# Patient Record
Sex: FEMALE | Race: WHITE | URBAN - METROPOLITAN AREA
[De-identification: names, ages, dates, MRNs, and addresses within clinical notes are randomized per-mention and may not be internally consistent; named-entity substitution may affect disease eponyms.]

---

## 2020-02-21 ENCOUNTER — HISTORICAL (OUTPATIENT)
Dept: RADIOLOGY | Facility: HOSPITAL | Age: 40
End: 2020-02-21

## 2020-03-10 ENCOUNTER — HISTORICAL (OUTPATIENT)
Dept: ADMINISTRATIVE | Facility: HOSPITAL | Age: 40
End: 2020-03-10

## 2020-03-10 LAB
ABS NEUT (OLG): 4.58 X10(3)/MCL (ref 2.1–9.2)
BASOPHILS # BLD AUTO: 0.1 X10(3)/MCL (ref 0–0.2)
BASOPHILS NFR BLD AUTO: 1 %
EOSINOPHIL # BLD AUTO: 0.2 X10(3)/MCL (ref 0–0.9)
EOSINOPHIL NFR BLD AUTO: 3 %
ERYTHROCYTE [DISTWIDTH] IN BLOOD BY AUTOMATED COUNT: 12.9 % (ref 11.5–14.5)
HCT VFR BLD AUTO: 41.3 % (ref 35–46)
HGB BLD-MCNC: 13.3 GM/DL (ref 12–16)
IMM GRANULOCYTES # BLD AUTO: 0.02 10*3/UL
IMM GRANULOCYTES NFR BLD AUTO: 0 %
LYMPHOCYTES # BLD AUTO: 3 X10(3)/MCL (ref 0.6–4.6)
LYMPHOCYTES NFR BLD AUTO: 35 %
MCH RBC QN AUTO: 29 PG (ref 26–34)
MCHC RBC AUTO-ENTMCNC: 32.2 GM/DL (ref 31–37)
MCV RBC AUTO: 90 FL (ref 80–100)
MONOCYTES # BLD AUTO: 0.6 X10(3)/MCL (ref 0.1–1.3)
MONOCYTES NFR BLD AUTO: 7 %
NEUTROPHILS # BLD AUTO: 4.58 X10(3)/MCL (ref 2.1–9.2)
NEUTROPHILS NFR BLD AUTO: 54 %
PLATELET # BLD AUTO: 340 X10(3)/MCL (ref 130–400)
PMV BLD AUTO: 8.9 FL (ref 7.4–10.4)
RBC # BLD AUTO: 4.59 X10(6)/MCL (ref 4–5.2)
WBC # SPEC AUTO: 8.4 X10(3)/MCL (ref 4.5–11)

## 2021-03-10 ENCOUNTER — HISTORICAL (OUTPATIENT)
Dept: ADMINISTRATIVE | Facility: HOSPITAL | Age: 41
End: 2021-03-10

## 2021-03-10 LAB
ABS NEUT (OLG): 3.33 X10(3)/MCL (ref 2.1–9.2)
ALBUMIN SERPL-MCNC: 4 GM/DL (ref 3.5–5)
ALBUMIN/GLOB SERPL: 1.2 RATIO (ref 1.1–2)
ALP SERPL-CCNC: 71 UNIT/L (ref 40–150)
ALT SERPL-CCNC: 26 UNIT/L (ref 0–55)
AST SERPL-CCNC: 20 UNIT/L (ref 5–34)
BASOPHILS # BLD AUTO: 0.1 X10(3)/MCL (ref 0–0.2)
BASOPHILS NFR BLD AUTO: 1 %
BILIRUB SERPL-MCNC: 0.4 MG/DL
BILIRUBIN DIRECT+TOT PNL SERPL-MCNC: 0.2 MG/DL (ref 0–0.5)
BILIRUBIN DIRECT+TOT PNL SERPL-MCNC: 0.2 MG/DL (ref 0–0.8)
BUN SERPL-MCNC: 12.7 MG/DL (ref 7–18.7)
CALCIUM SERPL-MCNC: 9.1 MG/DL (ref 8.4–10.2)
CHLORIDE SERPL-SCNC: 105 MMOL/L (ref 98–107)
CHOLEST SERPL-MCNC: 203 MG/DL
CHOLEST/HDLC SERPL: 4 {RATIO} (ref 0–5)
CO2 SERPL-SCNC: 26 MMOL/L (ref 22–29)
CREAT SERPL-MCNC: 0.86 MG/DL (ref 0.55–1.02)
DEPRECATED CALCIDIOL+CALCIFEROL SERPL-MC: 38.4 NG/ML (ref 30–80)
EOSINOPHIL # BLD AUTO: 0.3 X10(3)/MCL (ref 0–0.9)
EOSINOPHIL NFR BLD AUTO: 4 %
ERYTHROCYTE [DISTWIDTH] IN BLOOD BY AUTOMATED COUNT: 12.7 % (ref 11.5–14.5)
EST. AVERAGE GLUCOSE BLD GHB EST-MCNC: 102.5 MG/DL
FOLATE SERPL-MCNC: 7.5 NG/ML (ref 7–31.4)
GLOBULIN SER-MCNC: 3.3 GM/DL (ref 2.4–3.5)
GLUCOSE SERPL-MCNC: 98 MG/DL (ref 74–100)
HBA1C MFR BLD: 5.2 %
HCT VFR BLD AUTO: 41.4 % (ref 35–46)
HDLC SERPL-MCNC: 58 MG/DL (ref 35–60)
HGB BLD-MCNC: 13.7 GM/DL (ref 12–16)
IMM GRANULOCYTES # BLD AUTO: 0.01 10*3/UL
IMM GRANULOCYTES NFR BLD AUTO: 0 %
LDLC SERPL CALC-MCNC: 116 MG/DL (ref 50–140)
LYMPHOCYTES # BLD AUTO: 3 X10(3)/MCL (ref 0.6–4.6)
LYMPHOCYTES NFR BLD AUTO: 40 %
MCH RBC QN AUTO: 29.5 PG (ref 26–34)
MCHC RBC AUTO-ENTMCNC: 33.1 GM/DL (ref 31–37)
MCV RBC AUTO: 89.2 FL (ref 80–100)
MONOCYTES # BLD AUTO: 0.6 X10(3)/MCL (ref 0.1–1.3)
MONOCYTES NFR BLD AUTO: 8 %
NEUTROPHILS # BLD AUTO: 3.33 X10(3)/MCL (ref 2.1–9.2)
NEUTROPHILS NFR BLD AUTO: 46 %
PLATELET # BLD AUTO: 323 X10(3)/MCL (ref 130–400)
PMV BLD AUTO: 9.2 FL (ref 7.4–10.4)
POTASSIUM SERPL-SCNC: 3.9 MMOL/L (ref 3.5–5.1)
PROT SERPL-MCNC: 7.3 GM/DL (ref 6.4–8.3)
RBC # BLD AUTO: 4.64 X10(6)/MCL (ref 4–5.2)
SODIUM SERPL-SCNC: 141 MMOL/L (ref 136–145)
TRIGL SERPL-MCNC: 147 MG/DL (ref 37–140)
TSH SERPL-ACNC: 3.11 UIU/ML (ref 0.35–4.94)
VLDLC SERPL CALC-MCNC: 29 MG/DL
WBC # SPEC AUTO: 7.3 X10(3)/MCL (ref 4.5–11)

## 2021-04-26 ENCOUNTER — HISTORICAL (OUTPATIENT)
Dept: RADIOLOGY | Facility: HOSPITAL | Age: 41
End: 2021-04-26

## 2021-05-06 LAB — POC BETA-HCG (QUAL): POSITIVE

## 2021-06-21 LAB — POC BETA-HCG (QUAL): POSITIVE

## 2021-06-24 ENCOUNTER — HISTORICAL (OUTPATIENT)
Dept: ADMINISTRATIVE | Facility: HOSPITAL | Age: 41
End: 2021-06-24

## 2021-06-24 LAB
ABS NEUT (OLG): 5.52 X10(3)/MCL (ref 2.1–9.2)
AMPHET UR QL SCN: NEGATIVE
BARBITURATE SCN PRESENT UR: NEGATIVE
BASOPHILS # BLD AUTO: 0.1 X10(3)/MCL (ref 0–0.2)
BASOPHILS NFR BLD AUTO: 1 %
BENZODIAZ UR QL SCN: NEGATIVE
CANNABINOIDS UR QL SCN: NEGATIVE
COCAINE UR QL SCN: NEGATIVE
EOSINOPHIL # BLD AUTO: 0.2 X10(3)/MCL (ref 0–0.9)
EOSINOPHIL NFR BLD AUTO: 2 %
ERYTHROCYTE [DISTWIDTH] IN BLOOD BY AUTOMATED COUNT: 12.9 % (ref 11.5–17)
GROUP & RH: NORMAL
HBV SURFACE AG SERPL QL IA: NONREACTIVE
HCT VFR BLD AUTO: 37.4 % (ref 37–47)
HCV AB SERPL QL IA: NONREACTIVE
HGB BLD-MCNC: 13 GM/DL (ref 12–16)
HIV 1+2 AB+HIV1 P24 AG SERPL QL IA: NONREACTIVE
LYMPHOCYTES # BLD AUTO: 2.4 X10(3)/MCL (ref 0.6–4.6)
LYMPHOCYTES NFR BLD AUTO: 27 %
MCH RBC QN AUTO: 30.7 PG (ref 27–31)
MCHC RBC AUTO-ENTMCNC: 34.8 GM/DL (ref 33–36)
MCV RBC AUTO: 88.2 FL (ref 80–94)
MONOCYTES # BLD AUTO: 0.7 X10(3)/MCL (ref 0.1–1.3)
MONOCYTES NFR BLD AUTO: 8 %
NEUTROPHILS # BLD AUTO: 5.52 X10(3)/MCL (ref 2.1–9.2)
NEUTROPHILS NFR BLD AUTO: 62 %
OPIATES UR QL SCN: NEGATIVE
PCP UR QL: NEGATIVE
PH UR STRIP.AUTO: 6 [PH] (ref 5–7.5)
PLATELET # BLD AUTO: 289 X10(3)/MCL (ref 130–400)
PMV BLD AUTO: 9.5 FL (ref 9.4–12.4)
RBC # BLD AUTO: 4.24 X10(6)/MCL (ref 4.2–5.4)
SP GR FLD REFRACTOMETRY: 1.02 (ref 1–1.03)
T PALLIDUM AB SER QL: NONREACTIVE
T4 FREE SERPL-MCNC: 0.66 NG/DL (ref 0.7–1.48)
TSH SERPL-ACNC: 1.55 UIU/ML (ref 0.35–4.94)
WBC # SPEC AUTO: 8.8 X10(3)/MCL (ref 4.5–11.5)

## 2021-11-08 ENCOUNTER — HISTORICAL (OUTPATIENT)
Dept: ADMINISTRATIVE | Facility: HOSPITAL | Age: 41
End: 2021-11-08

## 2021-11-08 LAB — SARS-COV-2 RNA RESP QL NAA+PROBE: NOT DETECTED

## 2021-11-10 ENCOUNTER — HISTORICAL (OUTPATIENT)
Dept: OBSTETRICS AND GYNECOLOGY | Facility: HOSPITAL | Age: 41
End: 2021-11-10

## 2021-12-28 LAB — POC BETA-HCG (QUAL): NEGATIVE

## 2022-04-10 ENCOUNTER — HISTORICAL (OUTPATIENT)
Dept: ADMINISTRATIVE | Facility: HOSPITAL | Age: 42
End: 2022-04-10

## 2022-04-28 VITALS
WEIGHT: 180 LBS | DIASTOLIC BLOOD PRESSURE: 69 MMHG | BODY MASS INDEX: 29.99 KG/M2 | OXYGEN SATURATION: 98 % | SYSTOLIC BLOOD PRESSURE: 120 MMHG | HEIGHT: 65 IN

## 2022-05-04 NOTE — HISTORICAL [facility] CERNER
This is a historical note converted from [employer]. Formatting and pictures may have been removed.  Please reference [employer] for original formatting and attached multimedia. Chief Complaint  Follow up  History of Present Illness  39 y/o female here for follow up  ?  Left ear pressure/pain. Reports recently getting over cold symptoms. Still having pain and pressure in bilateral areas. Denied eye discharge, sore throat, fever, chills, sneezing. +Cough, post nasal drip.  ?  Low vitamin D: Previous labs reviewed. Vitamin D 12.2. Not on any vitamin D supplements.  ?  Thrombocytosis: Previous labs reviewed. Patient without symptoms of fever, night sweats, weight loss. Denied recent trauma, splenectomy, bleeding  ?  Pelvic Pain: Still experiencing pelvic pain. Pelvic US reviewed. Awaiting GYN referral  Review of Systems  Constitutional: no weight gain,?no weight loss,?no fatigue, no fever,?no chills,?no weakness  ENMT: No decreased hearing, no drainage, no nosebleeds, no dry mouth, no hoarseness, other symptoms per HPI  Respiratory: No cough, no hemoptysis, no shortness of breath, no wheeze  Cardiovascular: No chest pain, no tightness, no palpitations  Gastrointestinal:No swallowing difficulty, no change in appetite, no change in bowel habits, no rectal bleeding  Genitourinary: No frequency, no urgency, no burning, no blood in urine, no incontinence  Endocrine: No heat or cold intolerance, no sweating, no frequent urination, no thirst, no change in appetite  Integumentary: No rash, no blisters, no itching, no hair or nail changes  Neurologic: No dizziness, no fainting, no seizures, no weakness  All Other ROS negative  Physical Exam  Vitals & Measurements  T:?37.1? ?C (Oral)? HR:?88(Peripheral)? RR:?18? BP:?132/88? SpO2:?99%?  HT:?165?cm? WT:?75.0?kg? BMI:?27.55? LMP:?02/19/2020 00:00 CST?  General: Alert, well appearing, in no acute distress, pleasant  Eye: PERRLA, EOMI, clear conjunctiva, No pallor,?  HENT: Oropharynx and  nasal mucosal surfaces moist, TM left erythema, decreased light reflex, non tender  Neck: full range of motion, no thyromegaly or lymphadenopathy appreciated  Respiratory: clear to auscultation bilaterally, no wheezes, no crackles  Cardiovascular: regular rate and rhythm without murmurs, gallops or rubs  Gastrointestinal: soft, non-tender, non-distended with active bowel sounds  Genitourinary: no CVA tenderness to palpation, No external lesions, No cervical motion tenderness, No discharge, No adnexal tenderness, no masses  Musculoskeletal: Able to ambulate to exam table without difficulty  Neurologic: no signs of peripheral neurological deficit, motor/sensory function intact?  Assessment/Plan  Acute left otitis media?H66.92  ?Amoxil sent to pharmacy  ?Sudafed OTC  ?Contact clinic if no improvement  ?  Chronic pelvic pain in female?R10.2  ?Stable  Awaiting GYN referral  US reviewed  Ordered:  Clinic Follow up, *Est. 09/10/20 3:00:00 CDT, Order for future visit, Otitis media  Low vitamin D level  History of thrombocytosis  Pelvic pain  ?  Elevated BP without diagnosis of hypertension?R03.0  ?If BP elevated elevated at next visit, consider discussing lifestyle modifications and medication, BP logs  ?  Low serum vitamin D?R79.89  ?Vitamin D supplements  Ordered:  Clinic Follow up, *Est. 09/10/20 3:00:00 CDT, Order for future visit, Otitis media  Low vitamin D level  History of thrombocytosis  Pelvic pain  ?  Thrombocytosis?D47.3  ?Repeat? CBC today  ?  Orders:  amoxicillin, 875 mg = 1 tab(s), Oral, BID, X 7 day(s), # 14 tab(s), 0 Refill(s), Pharmacy: [id number], 165, cm, Height/Length Dosing, 03/10/20 13:20:00 CDT, 75, kg, Weight Dosing, 03/10/20 13:20:00 CDT  ergocalciferol, 50,000 IntUnit = 1 cap(s), Oral, qWeek, # 4 cap(s), 2 Refill(s), Pharmacy: [id number], 165, cm, Height/Length Dosing, 03/10/20 13:20:00 CDT, 75, kg, Weight Dosing, 03/10/20 13:20:00 CDT  Referrals  Clinic Follow up, *Est.  09/10/20 3:00:00 CDT, Order for future visit, Otitis media  Low vitamin D level  History of thrombocytosis  Pelvic pain   Problem List/Past Medical History  Ongoing  No chronic problems  Historical  Pregnant  Pregnant  Pregnant  Pregnant  Pregnant  Pregnant  Pregnant  Procedure/Surgical History   section   Medications  amoxicillin 875 mg oral tablet, 875 mg= 1 tab(s), Oral, BID  ergocalciferol 50,000 intl units (1.25 mg) oral capsule, 12477 IntUnit= 1 cap(s), Oral, qWeek, 2 refills  ibuprofen 800 mg oral tablet, 800 mg= 1 tab(s), Oral, TID  [employer] ClearLax oral powder for reconstitution, 17 gm, Oral, Daily  Allergies  No Known Medication Allergies  Social History  Abuse/Neglect  No, No, Yes, 2020  Alcohol  Never, 2020  Employment/School  Unemployed, 2020  Exercise  Exercise frequency: Daily. Exercise type: Walking., 2020  Home/Environment  Lives with Children, Spouse., 2020  Nutrition/Health  Regular, 2020  Sexual  Sexually active: Yes. Number of current partners 1. Sexual orientation: Straight or heterosexual. Gender Identity Identifies as female. No, 2020  Substance Use  Never, 2020  Tobacco  Never (less than 100 in lifetime), N/A, 2020  Health Maintenance  Health Maintenance  ???Pending?(in the next year)  ??? ??Due?  ??? ? ? ?Tetanus Vaccine due??03/10/20??and every 10??year(s)  ??? ??Due In Future?  ??? ? ? ?Alcohol Misuse Screening not due until??21??and every 1??year(s)  ??? ? ? ?Obesity Screening not due until??21??and every 1??year(s)  ??? ? ? ?Depression Screening not due until??21??and every 1??year(s)  ??? ? ? ?ADL Screening not due until??21??and every 1??year(s)  ???Satisfied?(in the past 1 year)  ??? ??Satisfied?  ??? ? ? ?ADL Screening on??20.??Satisfied by Doreen Thornton  ??? ? ? ?Alcohol Misuse Screening on??03/10/20.??Satisfied by Doreen Thornton  ??? ? ? ?Blood Pressure Screening  on??03/10/20.??Satisfied by Doreen Thornton  ??? ? ? ?Body Mass Index Check on??03/10/20.??Satisfied by Doreen Thornton  ??? ? ? ?Cervical Cancer Screening on??02/19/20.??Satisfied by Kristin Bush  ??? ? ? ?Depression Screening on??02/18/20.??Satisfied by Doreen Thornton  ??? ? ? ?Influenza Vaccine on??02/18/20.??Satisfied by Doreen Thornton  ??? ? ? ?Obesity Screening on??03/10/20.??Satisfied by Doreen Thornton  ?

## 2022-05-04 NOTE — HISTORICAL [facility] CERNER
This is a historical note converted from [employer]. Formatting and pictures may have been removed.  Please reference Birgit for original formatting and attached multimedia. Chief Complaint  Follow Up  History of Present Illness  41-year-old female with a past medical history of allergic rhinitis,?chronic pelvic pain presents to clinic for follow-up.  Still experiencing pelvic pain. ?May be a little bit better. ?Definitely more patient.? Missed appointment with GYN due to transportation issues.  Has noticed?a lump in her rectum?and also one episode?of hematochezia/blood on tissue when wiping.? Denied history of hemorrhoids. ?Also reports abdominal pain?bloating. ?Has never been evaluated by GI.? Takes Tums with improvement of symptoms.  Denied nausea, vomiting, diarrhea, constipation  Review of Systems  A complete 14 point ROS was performed in full with pertinent positives described above Remainder of ROS done in full and are negative.?  Physical Exam  Vitals & Measurements  T:?36.7? ?C (Oral)? HR:?86(Peripheral)? RR:?18? BP:?131/90? SpO2:?98%?  HT:?166.00?cm? WT:?77.900?kg? BMI:?28.27? LMP:?03/09/2021 00:00 CST?  General: Alert, well appearing, in no acute distress,  Eye: ?EOMI, clear conjunctiva, No pallor, no icterus  HENT: TMs/ear canals clear, wearing mask  Neck: full range of motion, no thyromegaly or lymphadenopathy appreciated  Respiratory: clear to auscultation bilaterally, no wheezes, no crackles, breathing unlabored  Cardiovascular: regular rate and rhythm without murmurs, gallops or rubs  Gastrointestinal: soft, non-tender, non-distended  Genitourinary: no CVA tenderness to palpation,  Musculoskeletal: Able to ambulate to exam table without difficulty, 2+DP, no lower extremity edema  Integumentary: no rashes or skin lesions present,No open wounds, no ulcerations, sensations grossly intact  Neurologic: no signs of peripheral neurological deficit, motor/sensory function intact  Psych: logical thought, good eye  contact  Assessment/Plan  Allergic rhinitis?J30.9  Ordered:  Office/Outpatient Visit Level 4 Established 49563 PC, Fatigue  Pelvic pain  Rectal pain  Hematochezia  Allergic rhinitis, 03/10/21 15:53:00 CST  ?  Fatigue?R53.83  Ordered:  Office/Outpatient Visit Level 4 Established 98159 PC, Fatigue  Pelvic pain  Rectal pain  Hematochezia  Allergic rhinitis, 03/10/21 15:53:00 CST  ?  Hematochezia?K92.1  Ordered:  Office/Outpatient Visit Level 4 Established 14369 PC, Fatigue  Pelvic pain  Rectal pain  Hematochezia  Allergic rhinitis, 03/10/21 15:53:00 CST  ?  Pelvic pain?R10.2  Ordered:  Office/Outpatient Visit Level 4 Established 40472 PC, Fatigue  Pelvic pain  Rectal pain  Hematochezia  Allergic rhinitis, 03/10/21 15:53:00 CST  ?  Rectal pain?K62.89  ?  1. Will start Pepcid, Abdominal US ordered  2. Will set up with GYN  3. Referred to GI  4. Routine labs today  Ordered:  Office/Outpatient Visit Level 4 Established [id number] PC, Fatigue  Pelvic pain  Rectal pain  Hematochezia  Allergic rhinitis, 03/10/21 15:53:00 CST  ?  Orders:  famotidine, 20 mg = 1 tab(s), Oral, Daily, # 30 tab(s), 5 Refill(s), Pharmacy: [facility] #[id number], 166, cm, Height/Length Dosing, 03/10/21 15:04:00 CST, 77.9, kg, Weight Dosing, 03/10/21 15:04:00 CST  CBC w/ Auto Diff, Routine collect, 03/10/21 15:38:00 CST, Blood, Stop date 03/10/21 15:38:00 CST, Lab Collect, 03/10/21 15:38:00 CST  Comprehensive Metabolic Panel, Routine collect, 03/10/21 15:38:00 CST, Blood, Stop date 03/10/21 15:38:00 CST, Lab Collect, 03/10/21 15:38:00 CST  External Referral, Abdominal pain, hematochezia, GI, Dr. Watson, 03/10/21 15:19:00 CST, Abdominal pain  Folate Level, Routine collect, 03/10/21 15:38:00 CST, Blood, Stop date 03/10/21 15:38:00 CST, Lab Collect, 03/10/21 15:38:00 CST  Hemoglobin A1C [facility], Routine collect, 03/10/21 15:38:00 CST, Blood, Stop date 03/10/21 15:38:00 CST, Lab Collect, 03/10/21 15:38:00 CST  Lipid Panel, Routine collect, 03/10/21  15:38:00 CST, Blood, Stop date 03/10/21 15:38:00 CST, Lab Collect, 03/10/21 15:38:00 CST  Thyroid Stimulating Hormone, Routine collect, 03/10/21 15:38:00 CST, Blood, Stop date 03/10/21 15:38:00 CST, Lab Collect, 03/10/21 15:38:00 CST  Vitamin D, 25-Hydroxy Level, Routine collect, 03/10/21 15:38:00 CST, Blood, Stop date 03/10/21 15:38:00 CST, Lab Collect, 03/10/21 15:38:00 CST  Referrals  External Referral, Abdominal pain, hematochezia, GI, Dr. Watson, 03/10/21 15:19:00 CST, Abdominal pain   Problem List/Past Medical History  Ongoing  No chronic problems  Historical  Pregnant  Pregnant  Pregnant  Pregnant  Pregnant  Pregnant  Pregnant  Procedure/Surgical History   section   Medications  Pepcid 20 mg oral tablet, 20 mg= 1 tab(s), Oral, Daily, 5 refills  Allergies  No Known Medication Allergies  Social History  Abuse/Neglect  No, No, Yes, 09/10/2020  Alcohol  Never, 09/10/2020  Employment/School  Unemployed, 09/10/2020  Exercise  Exercise frequency: Daily. Exercise type: Walking., 09/10/2020  Home/Environment  Lives with Children, Spouse., 09/10/2020  Nutrition/Health  Regular, 09/10/2020  Sexual  Sexually active: Yes. Number of current partners 1. Sexual orientation: Straight or heterosexual. Gender Identity Identifies as female. No, 09/10/2020  Substance Use  Never, 09/10/2020  Tobacco  Never (less than 100 in lifetime), N/A, 09/10/2020  Family History  Autoimmune disease: Mother.  Hyperthyroidism.: Sister.  PCOS- polycystic ovary syndrome: Sister.  PKD - Polycystic kidney disease: Negative: Sister.  Rheumatoid arthritis: Mother.  Health Maintenance  Health Maintenance  ???Pending?(in the next year)  ??? ??OverDue  ??? ? ? ?Influenza Vaccine due??10/01/20??and every 1??day(s)  ??? ??Due?  ??? ? ? ?Tetanus Vaccine due??03/10/21??and every 10??year(s)  ??? ??Refused?  ??? ? ? ?Alcohol Misuse Screening due??21??and every 1??year(s)  ??? ??Due In Future?  ??? ? ? ?Obesity Screening not due  until??01/01/22??and every 1??year(s)  ???Satisfied?(in the past 1 year)  ??? ??Satisfied?  ??? ? ? ?ADL Screening on??03/10/21.??Satisfied by Mayo Dawn  ??? ? ? ?Blood Pressure Screening on??03/10/21.??Satisfied by Mayo Dawn  ??? ? ? ?Body Mass Index Check on??03/10/21.??Satisfied by Mayo Dawn  ??? ? ? ?Depression Screening on??03/10/21.??Satisfied by Mayo Dawn  ??? ? ? ?Influenza Vaccine on??03/10/21.??Satisfied by Mayo Dawn  ??? ? ? ?Obesity Screening on??03/10/21.??Satisfied by Mayo Dawn  ??? ??Refused?  ??? ? ? ?Alcohol Misuse Screening on??03/10/21.??Recorded by Mayo Dawn??Reason: Patient Refuses  ?

## 2022-09-21 ENCOUNTER — HISTORICAL (OUTPATIENT)
Dept: ADMINISTRATIVE | Facility: HOSPITAL | Age: 42
End: 2022-09-21